# Patient Record
Sex: FEMALE | Race: WHITE | URBAN - METROPOLITAN AREA
[De-identification: names, ages, dates, MRNs, and addresses within clinical notes are randomized per-mention and may not be internally consistent; named-entity substitution may affect disease eponyms.]

---

## 2017-07-13 ENCOUNTER — TELEPHONE (OUTPATIENT)
Dept: OBGYN CLINIC | Age: 22
End: 2017-07-13

## 2017-07-13 NOTE — TELEPHONE ENCOUNTER
Pt's mother sent letter requesting metabolic, LFT, calcium, iron, protein, iron and lyme testing and treatment at [facility]. LM with mom Sanjay Clifford)  [phone]  7/13/2017    Rec PCP fu.       Gamal Downs MD

## 2017-08-15 ENCOUNTER — OFFICE VISIT (OUTPATIENT)
Dept: OBGYN CLINIC | Age: 22
End: 2017-08-15

## 2017-08-15 VITALS
HEIGHT: 62 IN | SYSTOLIC BLOOD PRESSURE: 116 MMHG | WEIGHT: 124 LBS | DIASTOLIC BLOOD PRESSURE: 74 MMHG | BODY MASS INDEX: 22.82 KG/M2

## 2017-08-15 DIAGNOSIS — L30.9 DERMATITIS: ICD-10-CM

## 2017-08-15 DIAGNOSIS — Z01.411 ENCOUNTER FOR GYNECOLOGICAL EXAMINATION (GENERAL) (ROUTINE) WITH ABNORMAL FINDINGS: Primary | ICD-10-CM

## 2017-08-15 RX ORDER — BUDESONIDE AND FORMOTEROL FUMARATE DIHYDRATE 80; 4.5 UG/1; UG/1
2 AEROSOL RESPIRATORY (INHALATION) 2 TIMES DAILY
COMMUNITY

## 2017-08-15 RX ORDER — NORETHINDRONE ACETATE AND ETHINYL ESTRADIOL 1MG-20(21)
1 KIT ORAL DAILY
Qty: 3 PACKAGE | Refills: 0 | Status: SHIPPED | OUTPATIENT
Start: 2017-08-15

## 2017-08-15 RX ORDER — ALBUTEROL SULFATE 90 UG/1
AEROSOL, METERED RESPIRATORY (INHALATION)
COMMUNITY

## 2017-08-15 NOTE — PATIENT INSTRUCTIONS

## 2017-08-15 NOTE — PROGRESS NOTES
[facility]  [email or web address]  [phone]    Gerri Trivedi MD, FACOG       Annual Gynecologic Exam:  [facility]40  Chief Complaint   Patient presents with    Well Woman         April Pitts is a 24 y.o.  WHITE OR  female who presents for an annual well woman exam.  Patient's last menstrual period was 08/15/2017 (exact date). .    With regard to the Gardisil vaccine, she has received all 3 injections. She does report additional concerns today. Patient reports that her periods have been occurring twice a month for a few years, but she didn't notice it until her roommate in [employer] pointed out to her how close together her periods were. Patient also complaints of a rash under her arm that she believes may be caused by her deodorant. +tampon use    She also was at camp last week and c/o a rash under her left>right armpit. +irritation worse with deodorant/shaving      Menstrual status:  Her periods are heavy. She does not report dysmenorrhea/painful menses. She does report irregular bleeding. Sexual history and Contraception:  History   Sexual Activity    Sexual activity: Yes    Partners: Male    Birth control/ protection: Condom     She always use condoms with sexual activity  She does not reports new sexual partner(s) in the last year. The patient does not request STD testing. We recommended testing per CDC guidelines and at patient request.     Preventive Medicine History:  Her last annual GYN exam was about patient has never had an gyn exam before. Her most recent Pap smear result: N/A due to age protocol. Her most recent HR HPV screen was N/A due to age protocol. She does not have a history of MARV 2, 3 or cervical cancer.      Past Medical History:   Diagnosis Date    HPV vaccine counseling 08/15/2017    recieved 3/3 per pt     OB History    Para Term  AB Living   0 0 0 0 0 0   SAB TAB Ectopic Molar Multiple Live Births   0 0 0 0 0 0           No past surgical history on file. Family History   Problem Relation Age of Onset    Cancer Father      esophogeal      Social History     Social History    Marital status: SINGLE     Spouse name: N/A    Number of children: N/A    Years of education: N/A     Occupational History    Not on file. Social History Main Topics    Smoking status: Never Smoker    Smokeless tobacco: Never Used    Alcohol use No    Drug use: No    Sexual activity: Yes     Partners: Male     Birth control/ protection: Condom     Other Topics Concern    Not on file     Social History Narrative    No narrative on file       Allergies   Allergen Reactions    Peanut Anaphylaxis    Tree Nuts Anaphylaxis       Current Outpatient Prescriptions   Medication Sig    budesonide-formoterol (SYMBICORT) 80-4.5 mcg/actuation HFAA inhaler Take 2 Puffs by inhalation two (2) times a day.  albuterol (PROVENTIL HFA, VENTOLIN HFA, PROAIR HFA) 90 mcg/actuation inhaler Take  by inhalation.  norethindrone-ethinyl estradiol (JUNEL FE 1/20) 1 mg-20 mcg (21)/75 mg (7) tab Take 1 Tab by mouth daily. No current facility-administered medications for this visit. There is no problem list on file for this patient.       Review of Systems - History obtained from the patient  Constitutional: negative for weight loss, fever, night sweats  HEENT: negative for hearing loss, earache, congestion, snoring, sorethroat  CV: negative for chest pain, palpitations, edema  Resp: negative for cough, shortness of breath, wheezing  GI: negative for change in bowel habits, abdominal pain, black or bloody stools  : negative for frequency, dysuria, hematuria  GYN: see HPI  MSK: negative for back pain, joint pain, muscle pain  Breast: negative for breast lumps, nipple discharge, galactorrhea  Skin :see hpi  Neuro: negative for dizziness, headache, confusion, weakness  Psych: negative for anxiety, depression, change in mood  Heme/lymph: negative for bleeding, bruising, pallor    Physical Exam  Visit Vitals    /74    Ht 5' 2\" (1.575 m)    Wt 124 lb (56.2 kg)    LMP 08/15/2017 (Exact Date)    BMI 22.68 kg/m2       Constitutional  · Appearance: well-nourished, well developed, alert, in no acute distress    HENT  · Head and Face: appears normal    Neck  · Inspection/Palpation: normal appearance, no masses or tenderness  · Lymph Nodes: no lymphadenopathy present  · Thyroid: gland size normal, nontender, no nodules or masses present on palpation    Chest  · Respiratory Effort: breathing unlabored  · Auscultation: normal breath sounds    Cardiovascular  · Heart:  · Auscultation: regular rate and rhythm without murmur    Breasts  · Inspection of Breasts: breasts symmetrical, no skin changes, no discharge present, nipple appearance normal, no skin retraction present  · Palpation of Breasts and Axillae: no masses present on palpation, no breast tenderness  · Axillary Lymph Nodes: no lymphadenopathy present, erythematous papules l>r axilla    Gastrointestinal  · Abdominal Examination: abdomen non-tender to palpation, normal bowel sounds, no masses present  · Liver and spleen: no hepatomegaly present, spleen not palpable  · Hernias: no hernias identified    Genitourinary  · External Genitalia: normal appearance for age, no discharge present, no tenderness present, no inflammatory lesions present, no masses present  · Vagina: normal vaginal vault without central or paravaginal defects, blood tinged discharge present, no inflammatory lesions present, no masses present  · Bladder: non-tender to palpation  · Urethra: appears normal  · Cervix: normal   · Uterus: normal size, shape and consistency  · Adnexa: no adnexal tenderness present, no adnexal masses present  · Perineum: perineum within normal limits, no evidence of trauma, no rashes or skin lesions present  · Anus: anus within normal limits, no hemorrhoids present  · Inguinal Lymph Nodes: no lymphadenopathy present    Skin  · General Inspection:  See breast exam, no lesions identified    Neurologic/Psychiatric  · Mental Status:  · Orientation: grossly oriented to person, place and time  · Mood and Affect: mood normal, affect appropriate    Assessment:  24 y.o.  for well woman exam  Encounter Diagnoses   Name Primary?  Encounter for gynecological examination (general) (routine) with abnormal findings Yes    Dermatitis        Plan:  The patient was counseled about diet, exercise, healthy lifestyle  We discussed self breast exam  We discussed safer sex practices, condom use and risk factors for sexually transmitted diseases. We discussed current pap smear and HR HPV testing guidelines. We recommend follow up one year for routine annual gynecologic exam or sooner prn  We recommend routine follow up with her primary care doctor for management of chronic medical problems and non-gynecologic concerns  Handouts were given to the patient  We discussed calcium/vitamin D/weight bearing exercise and osteoporosis prevention    Discussed risks, benefits and alternatives of OCP/nuvaring/patch: including but not limited to dvt/pe/mi/cva/ca/gi risks. She is encouraged to read package insert and to follow up with me or her pharmacist with any questions or concerns. OCP h/o   OCP fu 3mos, bp check if decides to start OCP  We discussed progesterone only and non hormonal options for contraception including but not limited to condoms, IUDs, Nexplanon, and depo provera.     Menstrual charting    Rec topical antifungal/cortisone b/l axilla, good skin hygiene, fu with Derm if NI    Folllow up:  [x] return for annual well woman exam in one year or sooner if she is having problems  [] follow up and ultrasound  [] 6 months  [] 3 months  [] 6 weeks   [] 1 month    Orders Placed This Encounter    norethindrone-ethinyl estradiol (JUNEL FE 1/20) 1 mg-20 mcg (21)/75 mg (7) tab    PAP IG, CT-NG, RFX HPV CDJV([id number], [id number])       No results found for any visits on 08/15/17.

## 2017-08-15 NOTE — MR AVS SNAPSHOT
Visit Information Date & Time Provider Department Dept. Phone Encounter #  
 8/15/2017  1:00 PM Fernando Aleman MD [facility] [id number] Upcoming Health Maintenance Date Due  
 HPV AGE 9Y-34Y (1 of 3 - Female 3 Dose Series) 12/22/2006 PAP AKA CERVICAL CYTOLOGY 12/22/2016 INFLUENZA AGE 9 TO ADULT 8/1/2017 Allergies as of 8/15/2017  Review Complete On: 8/15/2017 By: Judson Newsome LPN No Known Allergies Current Immunizations  Never Reviewed No immunizations on file. Not reviewed this visit Vitals BP Height(growth percentile) Weight(growth percentile) LMP BMI OB Status 116/74 5' 2\" (1.575 m) 124 lb (56.2 kg) 08/15/2017 (Exact Date) 22.68 kg/m2 Having regular periods Smoking Status Never Smoker BMI and BSA Data Body Mass Index Body Surface Area  
 22.68 kg/m 2 1.57 m 2 Your Updated Medication List  
  
   
This list is accurate as of: 8/15/17  1:30 PM.  Always use your most recent med list.  
  
  
  
  
 albuterol 90 mcg/actuation inhaler Commonly known as:  PROVENTIL HFA, VENTOLIN HFA, PROAIR HFA Take  by inhalation. SYMBICORT 80-4.5 mcg/actuation Hfaa inhaler Generic drug:  budesonide-formoterol Take 2 Puffs by inhalation two (2) times a day. Patient Instructions Well Visit, Ages 25 to 48: Care Instructions Your Care Instructions Physical exams can help you stay healthy. Your doctor has checked your overall health and may have suggested ways to take good care of yourself. He or she also may have recommended tests. At home, you can help prevent illness with healthy eating, regular exercise, and other steps. Follow-up care is a key part of your treatment and safety. Be sure to make and go to all appointments, and call your doctor if you are having problems. It's also a good idea to know your test results and keep a list of the medicines you take. How can you care for yourself at home? · Reach and stay at a healthy weight. This will lower your risk for many problems, such as obesity, diabetes, heart disease, and high blood pressure. · Get at least 30 minutes of physical activity on most days of the week. Walking is a good choice. You also may want to do other activities, such as running, swimming, cycling, or playing tennis or team sports. Discuss any changes in your exercise program with your doctor. · Do not smoke or allow others to smoke around you. If you need help quitting, talk to your doctor about stop-smoking programs and medicines. These can increase your chances of quitting for good. · Talk to your doctor about whether you have any risk factors for sexually transmitted infections (STIs). Having one sex partner (who does not have STIs and does not have sex with anyone else) is a good way to avoid these infections. · Use birth control if you do not want to have children at this time. Talk with your doctor about the choices available and what might be best for you. · Protect your skin from too much sun. When you're outdoors from 10 a.m. to 4 p.m., stay in the shade or cover up with clothing and a hat with a wide brim. Wear sunglasses that block UV rays. Even when it's cloudy, put broad-spectrum sunscreen (SPF 30 or higher) on any exposed skin. · See a dentist one or two times a year for checkups and to have your teeth cleaned. · Wear a seat belt in the car. · Drink alcohol in moderation, if at all. That means no more than 2 drinks a day for men and 1 drink a day for women. Follow your doctor's advice about when to have certain tests. These tests can spot problems early. For everyone · Cholesterol. Have the fat (cholesterol) in your blood tested after age 21. Your doctor will tell you how often to have this done based on your age, family history, or other things that can increase your risk for heart disease. · Blood pressure. Have your blood pressure checked during a routine doctor visit. Your doctor will tell you how often to check your blood pressure based on your age, your blood pressure results, and other factors. · Vision. Talk with your doctor about how often to have a glaucoma test. 
· Diabetes. Ask your doctor whether you should have tests for diabetes. · Colon cancer. Have a test for colon cancer at age 48. You may have one of several tests. If you are younger than 48, you may need a test earlier if you have any risk factors. Risk factors include whether you already had a precancerous polyp removed from your colon or whether your parent, brother, sister, or child has had colon cancer. For women · Breast exam and mammogram. Talk to your doctor about when you should have a clinical breast exam and a mammogram. Medical experts differ on whether and how often women under 50 should have these tests. Your doctor can help you decide what is right for you. · Pap test and pelvic exam. Begin Pap tests at age 24. A Pap test is the best way to find cervical cancer. The test often is part of a pelvic exam. Ask how often to have this test. 
· Tests for sexually transmitted infections (STIs). Ask whether you should have tests for STIs. You may be at risk if you have sex with more than one person, especially if your partners do not wear condoms. For men · Tests for sexually transmitted infections (STIs). Ask whether you should have tests for STIs. You may be at risk if you have sex with more than one person, especially if you do not wear a condom. · Testicular cancer exam. Ask your doctor whether you should check your testicles regularly. · Prostate exam. Talk to your doctor about whether you should have a blood test (called a PSA test) for prostate cancer.  Experts differ on whether and when men should have this test. Some experts suggest it if you are older than 39 and are -American or have a father or brother who got prostate cancer when he was younger than 72. When should you call for help? Watch closely for changes in your health, and be sure to contact your doctor if you have any problems or symptoms that concern you. Where can you learn more? Go to http://warren-silvina.info/. Enter P072 in the search box to learn more about \"Well Visit, Ages 25 to 48: Care Instructions. \" Current as of: July 19, 2016 Content Version: 11.3 © 1607-1598 [employer]. Care instructions adapted under license by [employer] | [employer] (which disclaims liability or warranty for this information). If you have questions about a medical condition or this instruction, always ask your healthcare professional. [address] any warranty or liability for your use of this information. Introducing [facility]! [facility] introduces [employer] patient portal. Now you can access parts of your medical record, email your doctor's office, and request medication refills online. 1. In your internet browser, go to [email or web address] 2. Click on the First Time User? Click Here link in the Sign In box. You will see the New Member Sign Up page. 3. Enter your [employer] Access Code exactly as it appears below. You will not need to use this code after youve completed the sign-up process. If you do not sign up before the expiration date, you must request a new code. · [employer] Access Code:  Expires: 11/13/2017  1:02 PM 
 
4. Enter the last four digits of your Social Security Number (xxxx) and Date of Birth (mm/dd/yyyy) as indicated and click Submit. You will be taken to the next sign-up page. 5. Create a [employer] ID. This will be your [employer] login ID and cannot be changed, so think of one that is secure and easy to remember. 6. Create a [employer] password. You can change your password at any time. 7. Enter your Password Reset Question and Answer. This can be used at a later time if you forget your password. 8. Enter your e-mail address. You will receive e-mail notification when new information is available in [address] 9. Click Sign Up. You can now view and download portions of your medical record. 10. Click the Download Summary menu link to download a portable copy of your medical information. If you have questions, please visit the Frequently Asked Questions section of the [employer] website. Remember, [employer] is NOT to be used for urgent needs. For medical emergencies, dial 911. Now available from your iPhone and Android! Please provide this summary of care documentation to your next provider. If you have any questions after today's visit, please call [phone].

## 2017-08-16 ENCOUNTER — TELEPHONE (OUTPATIENT)
Dept: OBGYN CLINIC | Age: 22
End: 2017-08-16

## 2017-08-16 RX ORDER — NORETHINDRONE ACETATE AND ETHINYL ESTRADIOL AND FERROUS FUMARATE 1MG-20(21)
1 KIT ORAL DAILY
Qty: 3 PACKAGE | Refills: 4 | Status: SHIPPED | OUTPATIENT
Start: 2017-08-16

## 2017-08-16 NOTE — TELEPHONE ENCOUNTER
Mother called and was advise that she is not on the HIPPA form and I can not speak to her. This nurse contacted the patient and advised of MD recommendations and prescription sent as per MD order to patient preferred pharmacy. Patient advised if she wants her mother to be able to speak to us she needs to come in to the office and seen a new HIPPA form. Patient verbalized understanding.

## 2017-08-16 NOTE — TELEPHONE ENCOUNTER
Patient called back and was wondering if she may have brand name Loestrin, does not want Junel (generic).

## 2017-08-16 NOTE — TELEPHONE ENCOUNTER
Patient's mother, Sidra Cardoso (who is NOT on her PHI form), is calling with concern of receiving a generic Rx of Junel Fe vs Loestrin Fe. I advised I cannot give any medical information on patient because she is not authorized. Zay Joe then began to inform me of a rash her daughter received at [facility] and that Dr. María Uribe was suppose to give her Rx for that as well. I advised again that I cannot give any medical information on this patient to her. Zay Joe was very understanding and I apologized for any inconvenience. Advised for daughter to call us back to resolve these concerns. She voiced understanding.

## 2017-08-16 NOTE — TELEPHONE ENCOUNTER
Yes, but it may be more expensive: can change to loestrin 1/20fe and \"PATRICIA\" dispense as written so they do not substitute. Refill until fu/ae due. Current Outpatient Prescriptions:     budesonide-formoterol (SYMBICORT) 80-4.5 mcg/actuation HFAA inhaler, Take 2 Puffs by inhalation two (2) times a day., Disp: , Rfl:     albuterol (PROVENTIL HFA, VENTOLIN HFA, PROAIR HFA) 90 mcg/actuation inhaler, Take  by inhalation. , Disp: , Rfl:     norethindrone-ethinyl estradiol (JUNEL FE 1/20) 1 mg-20 mcg (21)/75 mg (7) tab, Take 1 Tab by mouth daily. , Disp: 3 Package, Rfl: 0

## 2017-08-18 LAB
C TRACH RRNA CVX QL NAA+PROBE: NEGATIVE
CYTOLOGIST CVX/VAG CYTO: NORMAL
CYTOLOGY CVX/VAG DOC THIN PREP: NORMAL
DX ICD CODE: NORMAL
LABCORP, 190119: NORMAL
Lab: NORMAL
N GONORRHOEA RRNA CVX QL NAA+PROBE: NEGATIVE
OTHER STN SPEC: NORMAL
PATH REPORT.FINAL DX SPEC: NORMAL
STAT OF ADQ CVX/VAG CYTO-IMP: NORMAL

## 2017-09-12 ENCOUNTER — TELEPHONE (OUTPATIENT)
Dept: OBGYN CLINIC | Age: 22
End: 2017-09-12

## 2017-09-12 NOTE — TELEPHONE ENCOUNTER
Call received at 11:27am    [facility] checked and this nurse advised Mother Rachel Garcia that she was not listed on the permission to release form and her daughter the patient needs to call the office. Mother verbalized understanding. 24year old patient last seen in the office on 8/151/7.

## 2018-05-18 ENCOUNTER — TELEPHONE (OUTPATIENT)
Dept: OBGYN CLINIC | Age: 23
End: 2018-05-18

## 2018-05-18 NOTE — TELEPHONE ENCOUNTER
She should have OCP fu/BP check 3mos after starting OCP, when did she start? Can have one month but would like to see how she is doing on OCP: fu appt.

## 2018-05-18 NOTE — TELEPHONE ENCOUNTER
Pharmacy stated the patient got a refill on 3/26/18 ( 3 packages) and is not due for refill till June. This nurse advised pharmacy that patient needs to be seen for further refills and that patient needs to call the office.

## 2018-05-18 NOTE — TELEPHONE ENCOUNTER
Call received at 538am    25year old patient last seen in the office on 8/15/17. [facility] pharmacy calling regarding a refill on her Loestrin last sent on 8/16/17. Phone encounter on 8/16/17 MD stated to refill till AE     8/15/17 AE plan states to have BP follow up in 3 months.         ? Ok to refill till AE due 8/18      Please advise